# Patient Record
Sex: MALE | Race: WHITE | Employment: FULL TIME | URBAN - METROPOLITAN AREA
[De-identification: names, ages, dates, MRNs, and addresses within clinical notes are randomized per-mention and may not be internally consistent; named-entity substitution may affect disease eponyms.]

---

## 2019-08-27 ENCOUNTER — HOSPITAL ENCOUNTER (EMERGENCY)
Age: 34
Discharge: HOME | End: 2019-08-27
Attending: EMERGENCY MEDICINE
Payer: COMMERCIAL

## 2019-08-27 VITALS
HEIGHT: 75 IN | SYSTOLIC BLOOD PRESSURE: 140 MMHG | HEART RATE: 66 BPM | RESPIRATION RATE: 16 BRPM | TEMPERATURE: 98.5 F | BODY MASS INDEX: 32.95 KG/M2 | DIASTOLIC BLOOD PRESSURE: 102 MMHG | OXYGEN SATURATION: 98 % | WEIGHT: 265 LBS

## 2019-08-27 DIAGNOSIS — S61.213A LACERATION OF LEFT MIDDLE FINGER W/O FOREIGN BODY W/O DAMAGE TO NAIL, INITIAL ENCOUNTER: Primary | ICD-10-CM

## 2019-08-27 PROCEDURE — 90471 IMMUNIZATION ADMIN: CPT | Performed by: EMERGENCY MEDICINE

## 2019-08-27 PROCEDURE — 99282 EMERGENCY DEPT VISIT SF MDM: CPT

## 2019-08-27 PROCEDURE — 4500000022 HC ED LEVEL 2 PROCEDURE

## 2019-08-27 PROCEDURE — 6360000002 HC RX W HCPCS: Performed by: EMERGENCY MEDICINE

## 2019-08-27 PROCEDURE — 90715 TDAP VACCINE 7 YRS/> IM: CPT | Performed by: EMERGENCY MEDICINE

## 2019-08-27 RX ORDER — LISINOPRIL 5 MG/1
5 TABLET ORAL DAILY
COMMUNITY

## 2019-08-27 RX ADMIN — TETANUS TOXOID, REDUCED DIPHTHERIA TOXOID AND ACELLULAR PERTUSSIS VACCINE, ADSORBED 0.5 ML: 5; 2.5; 8; 8; 2.5 SUSPENSION INTRAMUSCULAR at 21:05

## 2019-08-27 SDOH — HEALTH STABILITY: MENTAL HEALTH: HOW OFTEN DO YOU HAVE A DRINK CONTAINING ALCOHOL?: NEVER

## 2019-08-28 NOTE — ED PROVIDER NOTES
[facility] EMERGENCY DEPT VISIT      Patient Identification  Kateryna Every May is a 27 y.o. male. Chief Complaint   Laceration (finger laceration to 3rd digit of left hand on sharp metal of trailer aabout 1 hour ago)      History of Present Illness: This is a  27 y.o. male who presents ambulatory  to the ED with complaints of laceration to left nondominant middle finger on sharp metal. No loss of function or sensation. Cut a little skin off index finger also. Last tetanus unknown. Past Medical History:   Diagnosis Date    Hypertension        History reviewed. No pertinent surgical history.       Current Facility-Administered Medications:     Tetanus-Diphth-Acell Pertussis (BOOSTRIX) injection 0.5 mL, 0.5 mL, Intramuscular, Once, Waqas Nunez MD    Current Outpatient Medications:     lisinopril (PRINIVIL;ZESTRIL) 5 MG tablet, Take 5 mg by mouth daily, Disp: , Rfl:     No Known Allergies    Social History     Socioeconomic History    Marital status:      Spouse name: Not on file    Number of children: Not on file    Years of education: Not on file    Highest education level: Not on file   Occupational History    Not on file   Social Needs    Financial resource strain: Not on file    Food insecurity:     Worry: Not on file     Inability: Not on file    Transportation needs:     Medical: Not on file     Non-medical: Not on file   Tobacco Use    Smoking status: Never Smoker    Smokeless tobacco: Never Used   Substance and Sexual Activity    Alcohol use: Yes     Frequency: Never     Comment: socially    Drug use: Never    Sexual activity: Not on file   Lifestyle    Physical activity:     Days per week: Not on file     Minutes per session: Not on file    Stress: Not on file   Relationships    Social connections:     Talks on phone: Not on file     Gets together: Not on file     Attends [employer] service: Not on file     Active member of club or organization: Not on file     Attends meetings of clubs or organizations: Not on file     Relationship status: Not on file    Intimate partner violence:     Fear of current or ex partner: Not on file     Emotionally abused: Not on file     Physically abused: Not on file     Forced sexual activity: Not on file   Other Topics Concern    Not on file   Social History Narrative    Not on file       Nursing Notes Reviewed      ROS:  General: no fever  Musculoskeletal: no arthralgia, no myalgia, no back pain,  no joint swelling  NEURO:  no numbness, no weakness  DERM: no rash, no erythema, no ecchymosis, + wounds      PHYSICAL EXAM:  GENERAL APPEARANCE: Jeannette Medina is in no acute respiratory distress. Awake and alert. VITAL SIGNS:   ED Triage Vitals [08/27/19 2031]   Enc Vitals Group      BP (!) 140/102      Pulse 66      Resp 16      Temp 98.5 °F (36.9 °C)      Temp Source Oral      SpO2       Weight 265 lb (120.2 kg)      Height 6' 3\" (1.905 m)      Head Circumference       Peak Flow       Pain Score       Pain Loc       Pain Edu? Excl. in [address]? HEAD: Normocephalic, atraumatic. EYES:  Extraocular muscles are intact. Conjunctivas are pink. Negative scleral icterus. ENT:  Mucous membranes are moists. NECK: Nontender and supple. CHEST:normal effort  HEART:  Regular rate and rhythm. No murmurs. Strong and equal pulses in the upper  extremities. MUSCULOSKELETAL:  Active range of motion of the upper and lower extremities. No edema. No swelling or erythema to fingers. Full ROM of flexion and extension of all fingers with good strength. NEUROLOGICAL: Awake, alert and oriented x 3. Power intact in the upper and lower extremities. DERMATOLOGIC: No petechiae, rashes, or ecchymoses. 2cm laceration radial aspect of left middle finger over PIP joint. No tendons visualized.   1/2 cm avulsion of skin over radial aspect left index finger      ED COURSE AND MEDICAL DECISION MAKING:        Treatment in the department:  Patient received   Medications

## 2019-08-28 NOTE — ED NOTES
Polysporin and bandaid applied to avulsion. Sutures intact to 3rd digit laceration, wound well approximated. Polysporin, adaptic, dry, sterile gauze dressing followed by tube gauze dressing applied to 3rd digit wound. Pt tolerated without complaint.       Juan Antonio Alvarenga RN  08/27/19 2121

## 2023-09-18 ENCOUNTER — OFFICE VISIT (OUTPATIENT)
Age: 38
End: 2023-09-18

## 2023-09-18 VITALS
BODY MASS INDEX: 34.69 KG/M2 | TEMPERATURE: 97.8 F | OXYGEN SATURATION: 95 % | DIASTOLIC BLOOD PRESSURE: 81 MMHG | HEART RATE: 81 BPM | SYSTOLIC BLOOD PRESSURE: 144 MMHG | HEIGHT: 75 IN | WEIGHT: 279 LBS

## 2023-09-18 DIAGNOSIS — G44.311 INTRACTABLE ACUTE POST-TRAUMATIC HEADACHE: ICD-10-CM
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
## 2023-09-18 NOTE — PROGRESS NOTES
rash.   Neurological:      General: No focal deficit present. Mental Status: He is alert and oriented to person, place, and time. Cranial Nerves: No cranial nerve deficit. Sensory: No sensory deficit. Motor: No weakness. Coordination: Coordination normal.      Gait: Gait normal.   Psychiatric:         Mood and Affect: Mood normal.         Behavior: Behavior normal.           An electronic signature was used to authenticate this note.     --Tommy Mccallum MD

## 2023-09-19 ASSESSMENT — ENCOUNTER SYMPTOMS
SINUS PAIN: 0
VOMITING: 0
ROS SKIN COMMENTS: NO BRUISING
SHORTNESS OF BREATH: 0
CHEST TIGHTNESS: 0
RESPIRATORY NEGATIVE: 1
PHOTOPHOBIA: 0
ABDOMINAL PAIN: 0
FACIAL SWELLING: 0
EYE PAIN: 0

## 2025-01-15 ENCOUNTER — OFFICE VISIT (OUTPATIENT)
Age: 40
End: 2025-01-15

## 2025-01-15 VITALS
WEIGHT: 268.3 LBS | BODY MASS INDEX: 33.36 KG/M2 | OXYGEN SATURATION: 93 % | SYSTOLIC BLOOD PRESSURE: 100 MMHG | HEIGHT: 75 IN | HEART RATE: 87 BPM | DIASTOLIC BLOOD PRESSURE: 80 MMHG | TEMPERATURE: 97.9 F

## 2025-01-15 DIAGNOSIS — M79.631 PAIN OF RIGHT FOREARM: Primary | ICD-10-CM

## 2025-01-15 RX ORDER — LISINOPRIL 30 MG/1
30 TABLET ORAL DAILY
COMMUNITY

## 2025-01-15 NOTE — PROGRESS NOTES
Chapin Medina (:  1985) is a 39 y.o. male,Established patient, here for evaluation of the following chief complaint(s):  Arm Injury (Right arm injury occurring one week ago. Patient states he accidentally hit it on a car when trying to take a bolt off. Area is swollen and painful at this time. )      ASSESSMENT/PLAN:  1. Pain of right forearm  - XR RADIUS ULNA RIGHT (2 VIEWS); Future  - diclofenac sodium (VOLTAREN) 1 % GEL; Apply 2 g topically 4 times daily for 7 days  Dispense: 56 g; Refill: 0       Return if symptoms worsen or fail to improve.    SUBJECTIVE/OBJECTIVE:  PRESENT TO CLINIC WITH RIGHT FOREARM DISCOMFORT  FOR ONE WEEK AFTER HURT.       History provided by:  Patient  Arm Injury  Associated symptoms: myalgias        Vitals:    01/15/25 1720 01/15/25 1752   BP: (!) 141/98 100/80   Site: Left Upper Arm Right Upper Arm   Position: Sitting Sitting   Cuff Size: Large Adult Large Adult   Pulse: 87    Temp: 97.9 °F (36.6 °C)    TempSrc: Oral    SpO2: 93%    Weight: 121.7 kg (268 lb 4.8 oz)    Height: 1.905 m (6' 3\")        Review of Systems   Musculoskeletal:  Positive for arthralgias and myalgias.       Physical Exam  Constitutional:       Appearance: Normal appearance.   HENT:      Head: Normocephalic and atraumatic.      Nose: Nose normal.      Mouth/Throat:      Mouth: Mucous membranes are moist.   Eyes:      Pupils: Pupils are equal, round, and reactive to light.   Pulmonary:      Effort: Pulmonary effort is normal.      Breath sounds: Normal breath sounds.   Musculoskeletal:         General: Tenderness and signs of injury present. Normal range of motion.      Cervical back: Normal range of motion and neck supple.      Comments: RIGHT FOREARM   Skin:     General: Skin is warm.   Neurological:      Mental Status: He is alert and oriented to person, place, and time.   Psychiatric:         Mood and Affect: Mood normal.         Behavior: Behavior normal.           An electronic signature was used to